# Patient Record
Sex: FEMALE | Race: WHITE | NOT HISPANIC OR LATINO | URBAN - METROPOLITAN AREA
[De-identification: names, ages, dates, MRNs, and addresses within clinical notes are randomized per-mention and may not be internally consistent; named-entity substitution may affect disease eponyms.]

---

## 2020-06-01 ENCOUNTER — OFFICE VISIT (OUTPATIENT)
Dept: URBAN - METROPOLITAN AREA SURGERY CENTER 31 | Facility: SURGERY CENTER | Age: 55
End: 2020-06-01
Payer: COMMERCIAL

## 2020-06-01 ENCOUNTER — CLAIMS CREATED FROM THE CLAIM WINDOW (OUTPATIENT)
Dept: URBAN - METROPOLITAN AREA CLINIC 4 | Facility: CLINIC | Age: 55
End: 2020-06-01
Payer: COMMERCIAL

## 2020-06-01 DIAGNOSIS — K62.6 ULCER OF ANUS AND RECTUM: ICD-10-CM

## 2020-06-01 DIAGNOSIS — D12.3 ADENOMA OF TRANSVERSE COLON: ICD-10-CM

## 2020-06-01 DIAGNOSIS — K62.6 ANAL ULCER: ICD-10-CM

## 2020-06-01 DIAGNOSIS — R19.7 ACUTE DIARRHEA: ICD-10-CM

## 2020-06-01 DIAGNOSIS — R63.4 ABNORMAL INTENTIONAL WEIGHT LOSS: ICD-10-CM

## 2020-06-01 DIAGNOSIS — K62.5 RECTAL BLEEDING: ICD-10-CM

## 2020-06-01 DIAGNOSIS — R19.7 DIARRHEA: ICD-10-CM

## 2020-06-01 DIAGNOSIS — R10.9 ABDOMINAL PAIN: ICD-10-CM

## 2020-06-01 DIAGNOSIS — D25.0 SUBMUCOUS LEIOMYOMA OF UTERUS: ICD-10-CM

## 2020-06-01 PROCEDURE — G9937 DIG OR SURV COLSCO: HCPCS | Performed by: INTERNAL MEDICINE

## 2020-06-01 PROCEDURE — 88305 TISSUE EXAM BY PATHOLOGIST: CPT | Performed by: PATHOLOGY

## 2020-06-01 PROCEDURE — 45380 COLONOSCOPY AND BIOPSY: CPT | Performed by: INTERNAL MEDICINE

## 2020-06-01 PROCEDURE — G8907 PT DOC NO EVENTS ON DISCHARG: HCPCS | Performed by: INTERNAL MEDICINE

## 2020-06-01 PROCEDURE — 45385 COLONOSCOPY W/LESION REMOVAL: CPT | Performed by: INTERNAL MEDICINE

## 2020-06-01 RX ORDER — LAMOTRIGINE 100 MG/1
TABLET ORAL
Qty: 0 | Refills: 0 | Status: ACTIVE | COMMUNITY
Start: 1900-01-01 | End: 1900-01-01

## 2020-06-01 RX ORDER — RISPERIDONE 4 MG/1
TABLET ORAL
Qty: 0 | Refills: 0 | Status: ACTIVE | COMMUNITY
Start: 1900-01-01 | End: 1900-01-01

## 2020-06-01 RX ORDER — SUCRALFATE 1 G/1
TABLET ORAL
Qty: 0 | Refills: 0 | Status: ACTIVE | COMMUNITY
Start: 1900-01-01 | End: 1900-01-01

## 2020-06-01 RX ORDER — OMEPRAZOLE 20 MG/1
CAPSULE, DELAYED RELEASE ORAL
Qty: 0 | Refills: 0 | Status: ACTIVE | COMMUNITY
Start: 1900-01-01 | End: 1900-01-01

## 2020-06-01 RX ORDER — SERTRALINE 100 MG/1
TABLET, FILM COATED ORAL
Qty: 0 | Refills: 0 | Status: ACTIVE | COMMUNITY
Start: 1900-01-01 | End: 1900-01-01

## 2020-06-01 RX ORDER — BUSPIRONE HYDROCHLORIDE 30 MG/1
TAKE 1 TABLET (30 MG) BY ORAL ROUTE 2 TIMES PER DAY TABLET ORAL 2
Qty: 0 | Refills: 0 | Status: ACTIVE | COMMUNITY
Start: 1900-01-01 | End: 1900-01-01

## 2020-06-29 ENCOUNTER — OFFICE VISIT (OUTPATIENT)
Dept: URBAN - METROPOLITAN AREA CLINIC 128 | Facility: CLINIC | Age: 55
End: 2020-06-29
Payer: COMMERCIAL

## 2020-06-29 DIAGNOSIS — K58.9 IBS (IRRITABLE BOWEL SYNDROME) DIARRHEA PREDOMINANT: ICD-10-CM

## 2020-06-29 PROCEDURE — G8427 DOCREV CUR MEDS BY ELIG CLIN: HCPCS | Performed by: INTERNAL MEDICINE

## 2020-06-29 PROCEDURE — 3017F COLORECTAL CA SCREEN DOC REV: CPT | Performed by: INTERNAL MEDICINE

## 2020-06-29 PROCEDURE — G9903 PT SCRN TBCO ID AS NON USER: HCPCS | Performed by: INTERNAL MEDICINE

## 2020-06-29 PROCEDURE — 99214 OFFICE O/P EST MOD 30 MIN: CPT | Performed by: INTERNAL MEDICINE

## 2020-06-29 RX ORDER — SERTRALINE 100 MG/1
TABLET, FILM COATED ORAL
Qty: 0 | Refills: 0 | Status: ACTIVE | COMMUNITY
Start: 1900-01-01

## 2020-06-29 RX ORDER — OMEPRAZOLE 20 MG/1
CAPSULE, DELAYED RELEASE ORAL
Qty: 0 | Refills: 0 | Status: ACTIVE | COMMUNITY
Start: 1900-01-01

## 2020-06-29 RX ORDER — BUSPIRONE HYDROCHLORIDE 30 MG/1
TAKE 1 TABLET (30 MG) BY ORAL ROUTE 2 TIMES PER DAY TABLET ORAL 2
Qty: 0 | Refills: 0 | Status: DISCONTINUED | COMMUNITY
Start: 1900-01-01

## 2020-06-29 RX ORDER — RIFAXIMIN 550 MG/1
1 TABLET TABLET ORAL THREE TIMES A DAY
Qty: 42 TABLET | Refills: 0 | OUTPATIENT
Start: 2020-06-29 | End: 2020-07-13

## 2020-06-29 RX ORDER — RISPERIDONE 4 MG/1
TABLET ORAL
Qty: 0 | Refills: 0 | Status: DISCONTINUED | COMMUNITY
Start: 1900-01-01

## 2020-06-29 RX ORDER — SUCRALFATE 1 G/1
TABLET ORAL
Qty: 0 | Refills: 0 | Status: ACTIVE | COMMUNITY
Start: 1900-01-01

## 2020-06-29 RX ORDER — CLONAZEPAM 0.5 MG/1
1 TABLET AT BEDTIME TABLET ORAL ONCE A DAY
Status: ACTIVE | COMMUNITY

## 2020-06-29 RX ORDER — LAMOTRIGINE 100 MG/1
TABLET ORAL
Qty: 0 | Refills: 0 | Status: DISCONTINUED | COMMUNITY
Start: 1900-01-01

## 2020-06-29 RX ORDER — LAMOTRIGINE 150 MG/1
1 TABLET TABLET ORAL ONCE A DAY
Status: ACTIVE | COMMUNITY

## 2020-06-29 NOTE — HPI-TODAY'S VISIT:
patient still in pain when standing russel. + diarrhe bloating. recent coolon bx normal except for leiomyoma. labs normal. has never tried xifaxan.

## 2020-08-24 ENCOUNTER — OFFICE VISIT (OUTPATIENT)
Dept: URBAN - METROPOLITAN AREA CLINIC 128 | Facility: CLINIC | Age: 55
End: 2020-08-24
Payer: COMMERCIAL

## 2020-08-24 DIAGNOSIS — R19.7 DIARRHEA: ICD-10-CM

## 2020-08-24 DIAGNOSIS — R63.4 WEIGHT LOSS: ICD-10-CM

## 2020-08-24 DIAGNOSIS — K90.89 BILE SALT-INDUCED DIARRHEA: ICD-10-CM

## 2020-08-24 DIAGNOSIS — R10.9 ABDOMINAL PAIN: ICD-10-CM

## 2020-08-24 PROCEDURE — 3017F COLORECTAL CA SCREEN DOC REV: CPT | Performed by: INTERNAL MEDICINE

## 2020-08-24 PROCEDURE — G8427 DOCREV CUR MEDS BY ELIG CLIN: HCPCS | Performed by: INTERNAL MEDICINE

## 2020-08-24 PROCEDURE — G8420 CALC BMI NORM PARAMETERS: HCPCS | Performed by: INTERNAL MEDICINE

## 2020-08-24 PROCEDURE — 99214 OFFICE O/P EST MOD 30 MIN: CPT | Performed by: INTERNAL MEDICINE

## 2020-08-24 PROCEDURE — G9903 PT SCRN TBCO ID AS NON USER: HCPCS | Performed by: INTERNAL MEDICINE

## 2020-08-24 RX ORDER — SUCRALFATE 1 G/1
TABLET ORAL
Qty: 0 | Refills: 0 | Status: DISCONTINUED | COMMUNITY
Start: 1900-01-01

## 2020-08-24 RX ORDER — LAMOTRIGINE 150 MG/1
1 TABLET TABLET ORAL ONCE A DAY
Status: ACTIVE | COMMUNITY

## 2020-08-24 RX ORDER — CHOLESTYRAMINE 4 G/9G
1 SCOOP POWDER, FOR SUSPENSION ORAL ONCE A DAY
Qty: 30 | Refills: 6 | OUTPATIENT
Start: 2020-08-24

## 2020-08-24 RX ORDER — CLONAZEPAM 0.5 MG/1
1 TABLET AT BEDTIME TABLET ORAL ONCE A DAY
Status: ACTIVE | COMMUNITY

## 2020-08-24 RX ORDER — SERTRALINE 100 MG/1
TABLET, FILM COATED ORAL
Qty: 0 | Refills: 0 | Status: ACTIVE | COMMUNITY
Start: 1900-01-01

## 2020-08-24 RX ORDER — OMEPRAZOLE 20 MG/1
CAPSULE, DELAYED RELEASE ORAL
Qty: 0 | Refills: 0 | Status: DISCONTINUED | COMMUNITY
Start: 1900-01-01

## 2020-08-24 NOTE — HPI-TODAY'S VISIT:
f?u D 4-5 x a day with mucus. weight loss of 50 lbs since september. + cramping. mom  with pancreatic cancer. no imaging done to date. colon notable for rectal prolapse, tv leiomyoma o/w normal. occ small amount of blood on tp. s/p ccy.

## 2020-08-28 ENCOUNTER — LAB OUTSIDE AN ENCOUNTER (OUTPATIENT)
Dept: URBAN - METROPOLITAN AREA CLINIC 128 | Facility: CLINIC | Age: 55
End: 2020-08-28

## 2020-09-23 ENCOUNTER — LAB OUTSIDE AN ENCOUNTER (OUTPATIENT)
Dept: URBAN - METROPOLITAN AREA CLINIC 128 | Facility: CLINIC | Age: 55
End: 2020-09-23

## 2020-09-23 ENCOUNTER — OFFICE VISIT (OUTPATIENT)
Dept: URBAN - METROPOLITAN AREA CLINIC 128 | Facility: CLINIC | Age: 55
End: 2020-09-23
Payer: COMMERCIAL

## 2020-09-23 DIAGNOSIS — K86.9 PANCREATIC LESION: ICD-10-CM

## 2020-09-23 DIAGNOSIS — R19.7 DIARRHEA: ICD-10-CM

## 2020-09-23 DIAGNOSIS — R63.4 WEIGHT LOSS: ICD-10-CM

## 2020-09-23 DIAGNOSIS — R11.0 NAUSEA: ICD-10-CM

## 2020-09-23 PROCEDURE — G9903 PT SCRN TBCO ID AS NON USER: HCPCS | Performed by: INTERNAL MEDICINE

## 2020-09-23 PROCEDURE — G8420 CALC BMI NORM PARAMETERS: HCPCS | Performed by: INTERNAL MEDICINE

## 2020-09-23 PROCEDURE — 99214 OFFICE O/P EST MOD 30 MIN: CPT | Performed by: INTERNAL MEDICINE

## 2020-09-23 PROCEDURE — 3017F COLORECTAL CA SCREEN DOC REV: CPT | Performed by: INTERNAL MEDICINE

## 2020-09-23 PROCEDURE — G8427 DOCREV CUR MEDS BY ELIG CLIN: HCPCS | Performed by: INTERNAL MEDICINE

## 2020-09-23 RX ORDER — CLONAZEPAM 0.5 MG/1
1 TABLET AT BEDTIME TABLET ORAL ONCE A DAY
Status: ACTIVE | COMMUNITY

## 2020-09-23 RX ORDER — CHOLESTYRAMINE 4 G/9G
1 SCOOP POWDER, FOR SUSPENSION ORAL ONCE A DAY
Qty: 30 | Refills: 6 | Status: ACTIVE | COMMUNITY
Start: 2020-08-24

## 2020-09-23 RX ORDER — CHOLESTYRAMINE 4 G/9G
1 SCOOP POWDER, FOR SUSPENSION ORAL ONCE A DAY
Qty: 30 | Refills: 6 | OUTPATIENT
Start: 2020-09-23

## 2020-09-23 RX ORDER — SERTRALINE 100 MG/1
TABLET, FILM COATED ORAL
Qty: 0 | Refills: 0 | Status: ACTIVE | COMMUNITY
Start: 1900-01-01

## 2020-09-23 RX ORDER — LAMOTRIGINE 150 MG/1
1 TABLET TABLET ORAL ONCE A DAY
Status: ACTIVE | COMMUNITY

## 2020-09-23 NOTE — HPI-TODAY'S VISIT:
notes ib guard helps with diarrhea. still with nausea. doesn't want to eat. never received the cholestyramine. . ct showed hypoenhancement in neck of pancreas and recommended panc mri.  also showed fatty liver. patient reports 80 lb weight loss.

## 2020-10-08 LAB — TSH: 0.87

## 2020-10-12 ENCOUNTER — LAB OUTSIDE AN ENCOUNTER (OUTPATIENT)
Dept: URBAN - METROPOLITAN AREA CLINIC 128 | Facility: CLINIC | Age: 55
End: 2020-10-12

## 2020-10-23 LAB — PANCREATIC ELASTASE, FECAL: 145

## 2020-10-26 ENCOUNTER — TELEPHONE ENCOUNTER (OUTPATIENT)
Dept: URBAN - METROPOLITAN AREA CLINIC 92 | Facility: CLINIC | Age: 55
End: 2020-10-26

## 2020-10-29 ENCOUNTER — OFFICE VISIT (OUTPATIENT)
Dept: URBAN - METROPOLITAN AREA TELEHEALTH 2 | Facility: TELEHEALTH | Age: 55
End: 2020-10-29
Payer: COMMERCIAL

## 2020-10-29 DIAGNOSIS — K86.9 PANCREATIC LESION: ICD-10-CM

## 2020-10-29 DIAGNOSIS — K86.89 PANCREATIC INSUFFICIENCY: ICD-10-CM

## 2020-10-29 DIAGNOSIS — R19.7 DIARRHEA: ICD-10-CM

## 2020-10-29 PROCEDURE — 99214 OFFICE O/P EST MOD 30 MIN: CPT | Performed by: INTERNAL MEDICINE

## 2020-10-29 RX ORDER — CHOLESTYRAMINE 4 G/9G
1 SCOOP POWDER, FOR SUSPENSION ORAL ONCE A DAY
Qty: 30 | Refills: 6 | COMMUNITY
Start: 2020-09-23

## 2020-10-29 RX ORDER — LAMOTRIGINE 150 MG/1
1 TABLET TABLET ORAL ONCE A DAY
COMMUNITY

## 2020-10-29 RX ORDER — CHOLESTYRAMINE 4 G/9G
1 SCOOP POWDER, FOR SUSPENSION ORAL ONCE A DAY
Qty: 30 | Refills: 6 | COMMUNITY
Start: 2020-08-24

## 2020-10-29 RX ORDER — PANCRELIPASE LIPASE, PANCRELIPASE PROTEASE, PANCRELIPASE AMYLASE 10000; 32000; 42000 [USP'U]/1; [USP'U]/1; [USP'U]/1
AS DIRECTED CAPSULE, DELAYED RELEASE ORAL
Qty: 120 CAPSULE | Refills: 3 | OUTPATIENT
Start: 2020-10-29 | End: 2021-02-25

## 2020-10-29 RX ORDER — CLONAZEPAM 0.5 MG/1
1 TABLET AT BEDTIME TABLET ORAL ONCE A DAY
COMMUNITY

## 2020-10-29 RX ORDER — SERTRALINE 100 MG/1
TABLET, FILM COATED ORAL
Qty: 0 | Refills: 0 | COMMUNITY
Start: 1900-01-01

## 2020-10-29 NOTE — HPI-OTHER HISTORIES
still with diarrhea 2-3 x in am. falling at times. will d/w primary. + fecal elastase. D is despite cholestyrmaine bid. s/p gastric bypass. agrees to trial of creon.

## 2020-10-30 ENCOUNTER — TELEPHONE ENCOUNTER (OUTPATIENT)
Dept: URBAN - METROPOLITAN AREA CLINIC 92 | Facility: CLINIC | Age: 55
End: 2020-10-30

## 2020-11-04 ENCOUNTER — OFFICE VISIT (OUTPATIENT)
Dept: URBAN - METROPOLITAN AREA CLINIC 128 | Facility: CLINIC | Age: 55
End: 2020-11-04

## 2021-01-21 ENCOUNTER — OFFICE VISIT (OUTPATIENT)
Dept: URBAN - METROPOLITAN AREA CLINIC 128 | Facility: CLINIC | Age: 56
End: 2021-01-21

## 2021-01-21 RX ORDER — CHOLESTYRAMINE 4 G/9G
1 SCOOP POWDER, FOR SUSPENSION ORAL ONCE A DAY
Qty: 30 | Refills: 6 | COMMUNITY
Start: 2020-09-23

## 2021-01-21 RX ORDER — SERTRALINE 100 MG/1
TABLET, FILM COATED ORAL
Qty: 0 | Refills: 0 | COMMUNITY
Start: 1900-01-01

## 2021-01-21 RX ORDER — CLONAZEPAM 0.5 MG/1
1 TABLET AT BEDTIME TABLET ORAL ONCE A DAY
COMMUNITY

## 2021-01-21 RX ORDER — CHOLESTYRAMINE 4 G/9G
1 SCOOP POWDER, FOR SUSPENSION ORAL ONCE A DAY
Qty: 30 | Refills: 6 | COMMUNITY
Start: 2020-08-24

## 2021-01-21 RX ORDER — PANCRELIPASE LIPASE, PANCRELIPASE PROTEASE, PANCRELIPASE AMYLASE 10000; 32000; 42000 [USP'U]/1; [USP'U]/1; [USP'U]/1
AS DIRECTED CAPSULE, DELAYED RELEASE ORAL
Qty: 120 CAPSULE | Refills: 3 | Status: ACTIVE | COMMUNITY
Start: 2020-10-29 | End: 2021-02-25

## 2021-01-21 RX ORDER — LAMOTRIGINE 150 MG/1
1 TABLET TABLET ORAL ONCE A DAY
COMMUNITY

## 2021-02-17 ENCOUNTER — ERX REFILL RESPONSE (OUTPATIENT)
Dept: URBAN - METROPOLITAN AREA TELEHEALTH 2 | Facility: TELEHEALTH | Age: 56
End: 2021-02-17

## 2021-02-17 RX ORDER — PANCRELIPASE LIPASE, PANCRELIPASE PROTEASE, PANCRELIPASE AMYLASE 10000; 32000; 42000 [USP'U]/1; [USP'U]/1; [USP'U]/1
AS DIRECTED CAPSULE, DELAYED RELEASE ORAL
Qty: 120 | Refills: 2

## 2021-02-25 ENCOUNTER — OFFICE VISIT (OUTPATIENT)
Dept: URBAN - METROPOLITAN AREA CLINIC 128 | Facility: CLINIC | Age: 56
End: 2021-02-25

## 2021-02-25 RX ORDER — CLONAZEPAM 0.5 MG/1
1 TABLET AT BEDTIME TABLET ORAL ONCE A DAY
Status: ACTIVE | COMMUNITY

## 2021-02-25 RX ORDER — LAMOTRIGINE 150 MG/1
1 TABLET TABLET ORAL ONCE A DAY
Status: ACTIVE | COMMUNITY

## 2021-02-25 RX ORDER — CHOLESTYRAMINE 4 G/9G
1 SCOOP POWDER, FOR SUSPENSION ORAL ONCE A DAY
Qty: 30 | Refills: 6 | Status: ACTIVE | COMMUNITY
Start: 2020-08-24

## 2021-02-25 RX ORDER — PANCRELIPASE LIPASE, PANCRELIPASE PROTEASE, PANCRELIPASE AMYLASE 10000; 32000; 42000 [USP'U]/1; [USP'U]/1; [USP'U]/1
AS DIRECTED CAPSULE, DELAYED RELEASE ORAL
Qty: 120 CAPSULE | Refills: 3 | Status: ACTIVE | COMMUNITY
Start: 2020-10-29 | End: 2021-02-25

## 2021-02-25 RX ORDER — CHOLESTYRAMINE 4 G/9G
1 SCOOP POWDER, FOR SUSPENSION ORAL ONCE A DAY
Qty: 30 | Refills: 6 | Status: ACTIVE | COMMUNITY
Start: 2020-09-23

## 2021-02-25 RX ORDER — SERTRALINE 100 MG/1
TABLET, FILM COATED ORAL
Qty: 0 | Refills: 0 | Status: ACTIVE | COMMUNITY
Start: 1900-01-01

## 2021-07-01 ENCOUNTER — ERX REFILL RESPONSE (OUTPATIENT)
Dept: URBAN - METROPOLITAN AREA CLINIC 128 | Facility: CLINIC | Age: 56
End: 2021-07-01

## 2021-07-01 RX ORDER — CHOLESTYRAMINE 4 G/9G
1 SCOOP POWDER, FOR SUSPENSION ORAL ONCE A DAY
Qty: 30 | Refills: 6 | OUTPATIENT

## 2021-07-01 RX ORDER — CHOLESTYRAMINE 4 G/9G
USE 1 SCOOP DAILY POWDER, FOR SUSPENSION ORAL
Qty: 1134 GRAM | Refills: 3 | OUTPATIENT

## 2021-08-18 ENCOUNTER — ERX REFILL RESPONSE (OUTPATIENT)
Dept: URBAN - METROPOLITAN AREA CLINIC 128 | Facility: CLINIC | Age: 56
End: 2021-08-18

## 2021-08-18 RX ORDER — CHOLESTYRAMINE 4 G/9G
USE 1 SCOOP DAILY POWDER, FOR SUSPENSION ORAL
Qty: 1134 GRAM | Refills: 3 | OUTPATIENT

## 2022-08-23 ENCOUNTER — OFFICE VISIT (OUTPATIENT)
Dept: URBAN - METROPOLITAN AREA CLINIC 2 | Facility: CLINIC | Age: 57
End: 2022-08-23

## 2022-08-23 VITALS — HEIGHT: 66 IN

## 2022-08-23 RX ORDER — SERTRALINE 100 MG/1
TABLET, FILM COATED ORAL
Qty: 0 | Refills: 0 | Status: ACTIVE | COMMUNITY
Start: 1900-01-01

## 2022-08-23 RX ORDER — LAMOTRIGINE 150 MG/1
1 TABLET TABLET ORAL ONCE A DAY
Status: ACTIVE | COMMUNITY

## 2022-08-23 RX ORDER — CLONAZEPAM 0.5 MG/1
1 TABLET AT BEDTIME TABLET ORAL ONCE A DAY
Status: ACTIVE | COMMUNITY

## 2022-08-23 RX ORDER — CHOLESTYRAMINE 4 G/9G
USE 1 SCOOP DAILY POWDER, FOR SUSPENSION ORAL
Qty: 1134 GRAM | Refills: 3 | Status: ACTIVE | COMMUNITY

## 2022-09-22 ENCOUNTER — DASHBOARD ENCOUNTERS (OUTPATIENT)
Age: 57
End: 2022-09-22

## 2022-09-22 ENCOUNTER — WEB ENCOUNTER (OUTPATIENT)
Dept: URBAN - METROPOLITAN AREA CLINIC 2 | Facility: CLINIC | Age: 57
End: 2022-09-22

## 2022-09-22 ENCOUNTER — LAB OUTSIDE AN ENCOUNTER (OUTPATIENT)
Dept: URBAN - METROPOLITAN AREA CLINIC 2 | Facility: CLINIC | Age: 57
End: 2022-09-22

## 2022-09-22 ENCOUNTER — OFFICE VISIT (OUTPATIENT)
Dept: URBAN - METROPOLITAN AREA CLINIC 2 | Facility: CLINIC | Age: 57
End: 2022-09-22
Payer: COMMERCIAL

## 2022-09-22 VITALS
BODY MASS INDEX: 32.37 KG/M2 | HEIGHT: 66 IN | TEMPERATURE: 97.5 F | DIASTOLIC BLOOD PRESSURE: 90 MMHG | WEIGHT: 201.4 LBS | HEART RATE: 80 BPM | SYSTOLIC BLOOD PRESSURE: 140 MMHG

## 2022-09-22 DIAGNOSIS — R10.9 ABDOMINAL PAIN: ICD-10-CM

## 2022-09-22 DIAGNOSIS — K86.9 PANCREATIC LESION: ICD-10-CM

## 2022-09-22 DIAGNOSIS — R19.7 DIARRHEA: ICD-10-CM

## 2022-09-22 DIAGNOSIS — R11.0 NAUSEA: ICD-10-CM

## 2022-09-22 PROBLEM — 3855007 DISORDER OF PANCREAS: Status: ACTIVE | Noted: 2020-09-23

## 2022-09-22 PROCEDURE — 99214 OFFICE O/P EST MOD 30 MIN: CPT | Performed by: NURSE PRACTITIONER

## 2022-09-22 RX ORDER — CHOLESTYRAMINE 4 G/9G
USE 1 SCOOP DAILY POWDER, FOR SUSPENSION ORAL
Qty: 1134 GRAM | Refills: 3 | Status: ACTIVE | COMMUNITY

## 2022-09-22 RX ORDER — LAMOTRIGINE 150 MG/1
1 TABLET TABLET ORAL ONCE A DAY
Status: ACTIVE | COMMUNITY

## 2022-09-22 RX ORDER — CLONAZEPAM 0.5 MG/1
1 TABLET AT BEDTIME TABLET ORAL ONCE A DAY
Status: ACTIVE | COMMUNITY

## 2022-09-22 RX ORDER — SERTRALINE 100 MG/1
TABLET, FILM COATED ORAL
Qty: 0 | Refills: 0 | Status: ACTIVE | COMMUNITY
Start: 1900-01-01

## 2022-09-22 RX ORDER — ONDANSETRON 4 MG/1
1 TABLET ON THE TONGUE AND ALLOW TO DISSOLVE TABLET, ORALLY DISINTEGRATING ORAL TWICE DAILY
Qty: 60 TABLET | Refills: 1 | OUTPATIENT
Start: 2022-09-22

## 2022-09-22 NOTE — HPI-TODAY'S VISIT:
Very pleasant 57-year-old female seen today for complaints of diarrhea associated with nausea and abdominal pain.  Patient previously followed with Dr. Cohn.  However she lives in [place] and for the past 2 years has followed with another GI group.  Now she would like to reestablish care with us as we are on her insurance again.  She has long history of diarrhea.  She did have a low fecal elastase.  In the past she was given enzymes but is unsure if this helped.  Most recently she has been using cholestyramine which does help some with diarrhea but she still can have bad days.  Dicyclomine helps with abdominal cramps.  She uses Zofran for the nausea.  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  She is recommended MRCP.

## 2022-09-28 ENCOUNTER — TELEPHONE ENCOUNTER (OUTPATIENT)
Dept: URBAN - METROPOLITAN AREA CLINIC 92 | Facility: CLINIC | Age: 57
End: 2022-09-28

## 2022-09-28 RX ORDER — SERTRALINE 100 MG/1
TABLET, FILM COATED ORAL
Qty: 0 | Refills: 0 | Status: ACTIVE | COMMUNITY
Start: 1900-01-01

## 2022-09-28 RX ORDER — LAMOTRIGINE 150 MG/1
1 TABLET TABLET ORAL ONCE A DAY
Status: ACTIVE | COMMUNITY

## 2022-09-28 RX ORDER — CLONAZEPAM 0.5 MG/1
1 TABLET AT BEDTIME TABLET ORAL ONCE A DAY
Status: ACTIVE | COMMUNITY

## 2022-09-28 RX ORDER — CHOLESTYRAMINE 4 G/9G
USE 1 SCOOP DAILY POWDER, FOR SUSPENSION ORAL
Qty: 1134 GRAM | Refills: 3 | Status: ACTIVE | COMMUNITY

## 2022-09-28 RX ORDER — ONDANSETRON 4 MG/1
1 TABLET ON THE TONGUE AND ALLOW TO DISSOLVE TABLET, ORALLY DISINTEGRATING ORAL TWICE DAILY
Qty: 60 TABLET | Refills: 1 | Status: ACTIVE | COMMUNITY
Start: 2022-09-22

## 2022-09-28 RX ORDER — PANCRELIPASE 36000; 180000; 114000 [USP'U]/1; [USP'U]/1; [USP'U]/1
AS DIRECTED CAPSULE, DELAYED RELEASE PELLETS ORAL
Qty: 250 CAPSULE | Refills: 2 | OUTPATIENT
Start: 2022-09-29 | End: 2022-12-27

## 2022-09-29 PROBLEM — 37992001 PANCREATIC INSUFFICIENCY: Status: ACTIVE | Noted: 2020-10-29

## 2023-01-03 ENCOUNTER — TELEPHONE ENCOUNTER (OUTPATIENT)
Dept: URBAN - METROPOLITAN AREA CLINIC 80 | Facility: CLINIC | Age: 58
End: 2023-01-03

## 2023-01-03 RX ORDER — PANCRELIPASE 36000; 180000; 114000 [USP'U]/1; [USP'U]/1; [USP'U]/1
AS DIRECTED CAPSULE, DELAYED RELEASE PELLETS ORAL
Qty: 250 CAPSULE | Refills: 0
Start: 2022-09-29 | End: 2023-02-02

## 2023-01-31 ENCOUNTER — TELEPHONE ENCOUNTER (OUTPATIENT)
Dept: URBAN - METROPOLITAN AREA CLINIC 80 | Facility: CLINIC | Age: 58
End: 2023-01-31

## 2023-01-31 RX ORDER — PANCRELIPASE 36000; 180000; 114000 [USP'U]/1; [USP'U]/1; [USP'U]/1
AS DIRECTED CAPSULE, DELAYED RELEASE PELLETS ORAL
Qty: 250 CAPSULE | Refills: 5
Start: 2022-09-29 | End: 2023-07-30